# Patient Record
Sex: FEMALE | Race: BLACK OR AFRICAN AMERICAN
[De-identification: names, ages, dates, MRNs, and addresses within clinical notes are randomized per-mention and may not be internally consistent; named-entity substitution may affect disease eponyms.]

---

## 2018-06-15 ENCOUNTER — HOSPITAL ENCOUNTER (EMERGENCY)
Dept: HOSPITAL 74 - JERFT | Age: 33
Discharge: HOME | End: 2018-06-15
Payer: COMMERCIAL

## 2018-06-15 VITALS — SYSTOLIC BLOOD PRESSURE: 116 MMHG | TEMPERATURE: 98.8 F | DIASTOLIC BLOOD PRESSURE: 72 MMHG | HEART RATE: 111 BPM

## 2018-06-15 VITALS — BODY MASS INDEX: 27 KG/M2

## 2018-06-15 DIAGNOSIS — S80.212A: ICD-10-CM

## 2018-06-15 DIAGNOSIS — S50.02XA: ICD-10-CM
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
## 2018-06-15 DIAGNOSIS — S80.02XA: Primary | ICD-10-CM
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
## 2018-06-15 NOTE — PDOC
Rapid Medical Evaluation


Time Seen by Provider: 06/15/18 20:29


Medical Evaluation: 


 Allergies











Allergy/AdvReac Type Severity Reaction Status Date / Time


 


No Known Allergies Allergy   Verified 06/21/14 11:09











06/15/18 20:29


I have performed a brief in-person evaluation of this patient.





The patient presents with a chief complaint of: left knee and elbow pain s/p 

fall





Pertinent physical exam findings: left lateral neck muscle spasm. Abrasion and 

tenderness to anterior left knee





I have ordered the following: upt, xray





The patient will proceed to the ED for further evaluation.








**Discharge Disposition





- Diagnosis


 Fall (on) (from) other stairs and steps, initial encounter








- Referrals





- Patient Instructions





- Post Discharge Activity

## 2018-06-15 NOTE — PDOC
History of Present Illness





- General


Chief Complaint: Injury


Stated Complaint: FALL


Time Seen by Provider: 06/15/18 20:29


History Source: Patient


Exam Limitations: No Limitations





- History of Present Illness


Initial Comments: 





06/15/18 22:19


Patient states stumbled on stair this evening falling forward and down landing 

on left knee and left elbow.. No head injury.


Occurred: reports: just prior to arrival, this afternoon, this evening


Severity: reports: moderate


Pain Location: reports: lower extremity (left knee), neck, upper extremity (

left elbow)


Method of Injury: Yes: fall


Modifying Factors: improves with: None


Loss of Consciousness: no loss of consciousness


Associated Symptoms (Fall): denies symptoms





Past History





- Travel


Traveled outside of the country in the last 30 days: No


Close contact w/someone who was outside of country & ill: No





- Past Medical History


Allergies/Adverse Reactions: 


 Allergies











Allergy/AdvReac Type Severity Reaction Status Date / Time


 


No Known Allergies Allergy   Verified 06/21/14 11:09











Home Medications: 


Ambulatory Orders





No Home Medications 0 dose .ROUTE UTDICT 06/21/14 








Asthma: Yes


COPD: No





- Suicide/Smoking/Psychosocial Hx


Smoking History: Current every day smoker


Number of Cigarettes Smoked Daily: 3


Information on smoking cessation initiated: No


Hx Alcohol Use: No





**Review of Systems





- Review of Systems


Able to Perform ROS?: Yes


Is the patient limited English proficient: Yes


Constitutional: Yes: Symptoms Reported, See HPI.  No: Fever


HEENTM: No: Symptoms Reported


Musculoskeletal: Yes: Symptoms Reported, See HPI, Joint Swelling, Neck Pain (

and upper back)


Integumentary: Yes: Symptoms Reported, See HPI, Bruising


All Other Systems: Reviewed and Negative





*Physical Exam





- Vital Signs


 Last Vital Signs











Temp Pulse Resp BP Pulse Ox


 


 98.8 F   111 H  18   116/72   100 


 


 06/15/18 20:29  06/15/18 20:29  06/15/18 20:29  06/15/18 20:29  06/15/18 20:29














- Physical Exam


General Appearance: Yes: Nourished, Appropriately Dressed, Apparent Distress, 

Mild Distress


HEENT: positive: DAMASO, Normal ENT Inspection, TMs Normal, Pharynx Normal


Neck: positive: Tender (tenderness to the sternocleidomastoid muscles primarily 

on the left side. Has no true spinal tenderness no cervical spine injury.), 

Supple


Respiratory/Chest: positive: Lungs Clear, Normal Breath Sounds


Musculoskeletal: positive: Normal Inspection.  negative: Vertebral Tenderness


Extremity: positive: Normal Capillary Refill.  negative: Normal Inspection (I'

ll swelling and superficial contusion to patella of left knee. Patella is 

mobile without crepitus or step-offs. Has no true tenderness to medial or 

lateral aspect although has some fullness in posterior fossa. Neurovascular 

intact distal to knee. Ambulatory with mild limp.)


Integumentary: positive: Warm, Swelling, Bruising, Other (left elbow without 

swelling, tenderness, or evidence of injury. Able to supinate and pronate at 

wrist, and neurovascular intact to fingers.)


Neurologic: positive: CNs II-XII NML intact, Fully Oriented, Alert, Normal Mood/

Affect, Normal Response, Motor Strength 5/5





ED Treatment Course





- ADDITIONAL ORDERS


Additional order review: 


 Laboratory  Results











  06/15/18





  20:48


 


Urine HCG, Qual  Negative














Progress Note





- Progress Note


Progress Note: 





X-ray negative for fractures or dislocations, wound was cleaned and dressed 

with bacitracin ointment and Band-Aid. Given Ace wrap to apply at home for 

extra compression. Left elbow intact without any necessary treatment. Medicated 

with ibuprofen 600 mg and will follow up as needed





*DC/Admit/Observation/Transfer


Diagnosis at time of Disposition: 


 Fall (on) (from) other stairs and steps, initial encounter








- Discharge Dispostion


Disposition: HOME


Condition at time of disposition: Stable


Decision to Admit order: No





- Referrals





- Patient Instructions


Printed Discharge Instructions:  DI for Knee Sprain


Additional Instructions: 


Rest, ice to area on and off for 15 minutes 4-6 times a day


Avoid heavy lifting or exercise until pain and swelling is resolved or until 

further directed


Keep area highly elevated to reduce swelling


Use splints/Ace wrap as directed


Followup with orthopedist in one to 2 days if not improving, 


    if significantly improved may wait one week for followup with orthopedist





May use ibuprofen 2-200 mg tablets every 6 hours as needed for pain





- Post Discharge Activity

## 2020-02-24 ENCOUNTER — HOSPITAL ENCOUNTER (EMERGENCY)
Dept: HOSPITAL 74 - FER | Age: 35
Discharge: HOME | End: 2020-02-24
Payer: COMMERCIAL

## 2020-02-24 VITALS — TEMPERATURE: 98.1 F | SYSTOLIC BLOOD PRESSURE: 111 MMHG | DIASTOLIC BLOOD PRESSURE: 74 MMHG | HEART RATE: 95 BPM

## 2020-02-24 VITALS — BODY MASS INDEX: 29.8 KG/M2

## 2020-02-24 DIAGNOSIS — J45.909: ICD-10-CM
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
## 2020-02-24 DIAGNOSIS — S20.219A: ICD-10-CM

## 2020-02-24 DIAGNOSIS — N20.0: ICD-10-CM
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
## 2020-02-24 DIAGNOSIS — S43.51XA: Primary | ICD-10-CM

## 2020-02-24 DIAGNOSIS — F17.210: ICD-10-CM

## 2020-02-24 DIAGNOSIS — S30.0XXA: ICD-10-CM
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
## 2020-02-24 PROCEDURE — 3E0233Z INTRODUCTION OF ANTI-INFLAMMATORY INTO MUSCLE, PERCUTANEOUS APPROACH: ICD-10-PCS
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
## 2020-10-07 ENCOUNTER — HOSPITAL ENCOUNTER (EMERGENCY)
Dept: HOSPITAL 74 - JER | Age: 35
LOS: 1 days | Discharge: HOME | End: 2020-10-08
Payer: SELF-PAY

## 2020-10-07 VITALS — SYSTOLIC BLOOD PRESSURE: 120 MMHG | HEART RATE: 108 BPM | TEMPERATURE: 98.8 F | DIASTOLIC BLOOD PRESSURE: 79 MMHG

## 2020-10-07 VITALS — BODY MASS INDEX: 25.8 KG/M2

## 2020-10-07 DIAGNOSIS — S82.851A: Primary | ICD-10-CM

## 2020-10-07 PROCEDURE — 3E033NZ INTRODUCTION OF ANALGESICS, HYPNOTICS, SEDATIVES INTO PERIPHERAL VEIN, PERCUTANEOUS APPROACH: ICD-10-PCS

## 2020-10-07 PROCEDURE — 3E033GC INTRODUCTION OF OTHER THERAPEUTIC SUBSTANCE INTO PERIPHERAL VEIN, PERCUTANEOUS APPROACH: ICD-10-PCS

## 2020-10-07 NOTE — XMS
Summarization Of Episode

                           Created on:2020



Patient:MOISÉS HILLMAN

Sex:Female

:1985

External Reference #:[id number]





Demographics







                          Address                   [address] [place] [address] [address]



                                                    [place] [address]

 

                          Home Phone                [phone] CELL

 

                          Work Phone                [phone]

 

                          Preferred Language        English

 

                          Marital Status            Not  or 

 

                          Yazdanism Affiliation     CH

 

                          Race                      BL

 

                          Ethnic Group              Not  or 









Author







                          Organization              [facility]









Support







                Name            Relationship    Address         Phone

 

                WERO XAVIER Unavailable     [address] [facility] [address] [phone]



                                                [address] [place] [address] 

 

                ALYSSA            Unavailable     [address] [phone]



                                                [place] [address] 

 

                LEIDA HILLMAN MOTHER          [address] [phone]



                                                [address]           



                                                [place] [address] 









Re-disclosure Warning

The records that you are about to access may contain information from federally-
assisted alcohol or drug abuse programs. If such information is present, then 
the following federally mandated warning applies: This information has been 
disclosed to you from records protected by federal confidentiality rules (42 CFR
part 2). The federal rules prohibit you from making any further disclosure of 
this information unless further disclosure is expressly permitted by the written
consent of the person to whom it pertains or as otherwise permitted by 42 CFR 
part 2. A general authorization for the release of medical or other information 
is NOT sufficient for this purpose. The Federal rules restrict any use of the 
information to criminally investigate or prosecute any alcohol or drug abuse 
patient.The records that you are about to access may contain highly sensitive 
health information, the redisclosure of which is protected by Article 27-F of 
the [facility] Public Health law. If you continue you may haveaccess to 
information: Regarding HIV / AIDS; Provided by facilities licensed or operated 
by the [facility] Office of Mental Health; or Provided by the [facility]
Office for People With Developmental Disabilities. If such information is 
present, then the following [place] mandated warning applies: This 
information has been disclosed to you from confidential records which are 
protected by state law. State law prohibits you from making any further 
disclosure of this information without the specific written consent of the 
person to whom it pertains, or as otherwise permitted by law. Any unauthorized 
further disclosure in violation of state law may result in a fine or [facility] 
sentence or both. A general authorization for the release of medical or other 
information is NOT sufficient authorization for further disclosure.



Insurance Providers







          Payer name Policy type Policy ID Covered   Covered party's Policy    P

ellis



                    / Coverage           party ID  relationship to Mendez    Inf

ormation



                    type                          mendez              

 

          SELF PAY                                SP                  



          INSURANCE                                                   

 

          [facility]           [id number]           [place]                  [id number]

[id number]



          PLANS                                                       







Results







                    ID                  Date                Data Source

 

                    [id number]  2020 02:00:00 PM EDT [facility]











          Name      Value     Range     Interpretation Description Data      Sup

porting



                                        Code                Source(s) Document(s

)

 

          2019 Novel                                         [facility]    



          Coronavirus RNA                                                   



          Interpretation                                                   



          Unspecified                                                   



          Specimen                                                    



          Qualitative ANTONINO                                                   



          Probe Detection                                                   









                                        This lab was ordered by [id number] and reported by [facility] 

Lab.











                                        Procedure
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
## 2020-10-07 NOTE — PDOC
Attending Attestation





- Resident


Resident Name: Thor Posadas





- ED Attending Attestation


I have performed the following: I have examined & evaluated the patient, The 

case was reviewed & discussed with the resident, I agree w/resident's findings &

plan





- HPI


HPI: 





10/07/20 23:01


see resident hpi





- Physicial Exam


PE: 





10/07/20 23:01


see resident exam





- Medical Decision Making





10/07/20 23:01


35-year-old female status post fall with pain and deformity to the right ankle


X-rays consistent with trimalleolar fracture dislocation of the right ankle


Reduction was performed at the bedside with traction countertraction after 

Dilaudid 1 mg given IV


Successful reduction confirmed on postreduction film


Neurovascularly intact both prior to and post procedure


Case discussed with Dr. Tee covering orthopedics who will see the patient in 

clinic


We will DC with OCL posterior/horseshoe splint as well as crutches, 

nonweightbearing





Discharge





- Discharge Information


Problems reviewed: Yes


Clinical Impression/Diagnosis: 


 Trimalleolar fracture of right ankle





Condition: Good





- Follow up/Referral





- Patient Discharge Instructions





- Post Discharge Activity